# Patient Record
Sex: FEMALE | Race: WHITE
[De-identification: names, ages, dates, MRNs, and addresses within clinical notes are randomized per-mention and may not be internally consistent; named-entity substitution may affect disease eponyms.]

---

## 2023-08-08 ENCOUNTER — HOSPITAL ENCOUNTER (EMERGENCY)
Dept: HOSPITAL 47 - EC | Age: 23
Discharge: HOME | End: 2023-08-08
Payer: COMMERCIAL

## 2023-08-08 VITALS
DIASTOLIC BLOOD PRESSURE: 97 MMHG | RESPIRATION RATE: 16 BRPM | HEART RATE: 79 BPM | TEMPERATURE: 97.6 F | SYSTOLIC BLOOD PRESSURE: 137 MMHG

## 2023-08-08 DIAGNOSIS — Z91.040: ICD-10-CM

## 2023-08-08 DIAGNOSIS — S92.332A: Primary | ICD-10-CM
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
## 2023-08-08 DIAGNOSIS — S92.342A: ICD-10-CM

## 2023-08-08 PROCEDURE — 99283 EMERGENCY DEPT VISIT LOW MDM: CPT

## 2023-08-08 PROCEDURE — 29515 APPLICATION SHORT LEG SPLINT: CPT

## 2023-08-08 NOTE — XR
EXAMINATION TYPE: XR foot complete LT

 

DATE OF EXAM: 8/8/2023 9:37 PM

 

INDICATION: 

Patient age:Female;  23 years old; 

Reason for study: fall; 

 

COMPARISON: None

 

TECHNIQUE: The left foot was examined in the AP, oblique, and lateral projections.  

 

FINDINGS/IMPRESSION: 

1.  Acute fracture through the third metatarsal head with possible nondisplaced fourth metatarsal hea
d fracture. No additional fractures visualized. Consider CT for confirmation

2.  Soft tissue swelling likely secondary to #1.

## 2023-08-08 NOTE — ED
General Adult HPI





- General


Chief complaint: Extremity Injury, Lower


Stated complaint: Fall, Left Ankle


Time Seen by Provider: 08/08/23 21:16


Source: patient, RN notes reviewed, old records reviewed


Mode of arrival: ambulatory


Limitations: no limitations





- History of Present Illness


Initial comments: 





23-year-old female presenting for evaluation of left foot injury.  Patient was 

climbing on a large boulders and had mechanical fall resulting in trauma to the 

plantar surface of the left foot.  She also had some minor abrasions to the 

right extremity but does not report any significant pain complaints.  No head or

neck injury.





- Related Data


                                    Allergies











Allergy/AdvReac Type Severity Reaction Status Date / Time


 


latex AdvReac  Itching Verified 08/08/23 21:07














Review of Systems


ROS Statement: 


Those systems with pertinent positive or pertinent negative responses have been 

documented in the HPI.





ROS Other: All systems not noted in ROS Statement are negative.





Past Medical History


Past Medical History: No Reported History


History of Any Multi-Drug Resistant Organisms: None Reported


Past Surgical History: No Surgical Hx Reported


Past Psychological History: No Psychological Hx Reported


Smoking Status: Never smoker


Past Alcohol Use History: Occasional


Past Drug Use History: None Reported





General Exam


Limitations: no limitations


General appearance: alert, in no apparent distress


Head exam: Present: atraumatic, normocephalic


Eye exam: Present: normal appearance, PERRL


ENT exam: Present: normal exam


Neck exam: Present: normal inspection.  Absent: tenderness


Respiratory exam: Present: normal lung sounds bilaterally.  Absent: respiratory 

distress, wheezes


Cardiovascular Exam: Present: regular rate, normal rhythm


GI/Abdominal exam: Absent: distended


Extremities exam: Present: other (Abrasion to the plantar surface of the left 

foot, abrasion to the anterior shin on the right.  There is no gross deformity 

noted.  Distal pulses are intact.)


Neurological exam: Present: alert, oriented X3, CN II-XII intact.  Absent: motor

sensory deficit


Psychiatric exam: Present: normal affect, normal mood


Skin exam: Present: warm





Course


                                   Vital Signs











  08/08/23





  21:02


 


Temperature 98.2 F


 


Pulse Rate 80


 


Respiratory 18





Rate 


 


Blood Pressure 121/81


 


O2 Sat by Pulse 100





Oximetry 














Procedures





- Orthopedic Splinting/Casting


  ** Injury #1


Side: left


Lower Extremity Injury Location: foot


Lower Extremity Immobilizer: posterior splint


Other Orthopedic Equipment: crutches





Medical Decision Making





- Medical Decision Making





Was pt. sent in by a medical professional or institution (ERLINDA Tripp, NP, urgent 

care, hospital, or nursing home...) When possible be specific


@  -No


Did you speak to anyone other than the patient for history (EMS, parent, family,

police, friend...)? What history was obtained from this source 


@  -No


Did you review nursing and triage notes (agree or disagree)?  Why? 


@  -I reviewed and agree with nursing and triage notes


Were old charts reviewed (outside hosp., previous admission, EMS record, old 

EKG, old radiological studies, urgent care reports/EKG's, nursing home records)?

Report findings 


@  -No old charts were reviewed


Differential Diagnosis (chest pain, altered mental status, abdominal pain women,

abdominal pain men, vaginal bleeding, weakness, fever, dyspnea, syncope, 

headache, dizziness, GI bleed, back pain, seizure, CVA, palpatations, mental 

health, musculoskeletal)? 


@  -[Orthopedic injury to the left foot status post fall


EKG interpreted by me (3pts min.).


@  -As above


X-rays interpreted by me (1pt min.).


@X-ray showing a third and fourth metatarsal head fracture on the left


CT interpreted by me (1pt min.).


@  -None done


U/S interpreted by me (1pt. min.).


@  -None done


What testing was considered but not performed or refused? (CT, X-rays, U/S, 

labs)? Why?


@  -None


What meds were considered but not given or refused? Why?


@  -None


Did you discuss the management of the patient with other professionals 

(professionals i.e. ERLINDA Tripp, NP, lab, RT, psych nurse, , , te

acher, , )? Give summary


@  -No


Was smoking cessation discussed for >3mins.?


@  -No


Was critical care preformed (if so, how long)?


@  -No


Were there social determinants of health that impacted care today? How? 

(Homelessness, low income, unemployed, alcoholism, drug addiction, 

transportation, low edu. Level, literacy, decrease access to med. care, [facility], 

rehab)?


@  -No


Was there de-escalation of care discussed even if they declined (Discuss DNR or 

withdrawal of care, Hospice)? DNR status


@  -No


What co-morbidities impacted this encounter? (DM, HTN, Smoking, COPD, CAD, 

Cancer, CVA, ARF, Chemo, Hep., AIDS, mental health diagnosis, sleep apnea, 

morbid obesity)?


@  -None


Was patient admitted / discharged? Hospital course, mention meds given and rou

te, prescriptions, significant lab abnormalities, going to OR and other 

pertinent info.


@  -23-year-old female with mechanical fall, left foot injury, x-ray showing 

fracture of the head of the third and fourth metatarsal.  Patient placed in a 

posterior splint, given crutches, given orthopedic follow-up.  She will be 

nonweightbearing until she is evaluated by orthopedics.


Undiagnosed new problem with uncertain prognosis?


@  -No


Drug Therapy requiring intensive monitoring for toxicity (Heparin, Nitro, 

Insulin, Cardizem)?


@  -No


Were any procedures done?


@  -yes, splinting


Diagnosis/symptom?


@  -[Metatarsal fracture


Acute, or Chronic, or Acute on Chronic?


@Acute


Uncomplicated (without systemic symptoms) or Complicated (systemic symptoms)?


@  -default


Side effects of treatment?


@  -No


Exacerbation, Progression, or Severe Exacerbation?


@  -No


Poses a threat to life or bodily function? How? (Chest pain, USA, MI, pneumonia,

 PE, COPD, DKA, ARF, appy, cholecystitis, CVA, Diverticulitis, Homicidal, 

Suicidal, threat to staff... and all critical care pts)


@  -No





Disposition


Clinical Impression: 


 Closed fracture of head of metatarsal bone





Disposition: HOME SELF-CARE


Condition: Fair


Instructions (If sedation given, give patient instructions):  Foot Fracture in 

Adults (ED)


Additional Instructions: 


Please ice and elevate the extremity.  Please take Tylenol and Motrin for pain. 

 Please follow up with orthopedics.


Is patient prescribed a controlled substance at d/c from ED?: No


Referrals: 


None,Stated [Primary Care Provider] - 1-2 days


Wyatt Jordan MD [STAFF PHYSICIAN] - 1-2 days


Time of Disposition: 21:56